# Patient Record
Sex: MALE | Race: WHITE | HISPANIC OR LATINO
[De-identification: names, ages, dates, MRNs, and addresses within clinical notes are randomized per-mention and may not be internally consistent; named-entity substitution may affect disease eponyms.]

---

## 2018-08-09 PROBLEM — Z00.00 ENCOUNTER FOR PREVENTIVE HEALTH EXAMINATION: Status: ACTIVE | Noted: 2018-08-09

## 2018-08-13 ENCOUNTER — APPOINTMENT (OUTPATIENT)
Dept: ORTHOPEDIC SURGERY | Facility: CLINIC | Age: 48
End: 2018-08-13
Payer: OTHER MISCELLANEOUS

## 2018-08-13 VITALS
HEART RATE: 78 BPM | SYSTOLIC BLOOD PRESSURE: 166 MMHG | HEIGHT: 65 IN | BODY MASS INDEX: 24.66 KG/M2 | DIASTOLIC BLOOD PRESSURE: 111 MMHG | WEIGHT: 148 LBS

## 2018-08-13 PROCEDURE — 99203 OFFICE O/P NEW LOW 30 MIN: CPT

## 2018-08-13 PROCEDURE — 73565 X-RAY EXAM OF KNEES: CPT

## 2018-09-04 ENCOUNTER — APPOINTMENT (OUTPATIENT)
Dept: ORTHOPEDIC SURGERY | Facility: CLINIC | Age: 48
End: 2018-09-04
Payer: OTHER MISCELLANEOUS

## 2018-09-04 DIAGNOSIS — Z78.9 OTHER SPECIFIED HEALTH STATUS: ICD-10-CM

## 2018-09-04 DIAGNOSIS — S80.01XA CONTUSION OF RIGHT KNEE, INITIAL ENCOUNTER: ICD-10-CM

## 2018-09-04 DIAGNOSIS — M25.561 PAIN IN RIGHT KNEE: ICD-10-CM

## 2018-09-04 PROCEDURE — 73562 X-RAY EXAM OF KNEE 3: CPT | Mod: RT

## 2018-09-04 PROCEDURE — 99213 OFFICE O/P EST LOW 20 MIN: CPT

## 2018-09-04 RX ORDER — IBUPROFEN 800 MG
TABLET ORAL
Refills: 0 | Status: ACTIVE | COMMUNITY

## 2020-07-25 ENCOUNTER — APPOINTMENT (OUTPATIENT)
Dept: MRI IMAGING | Facility: CLINIC | Age: 50
End: 2020-07-25

## 2020-08-01 ENCOUNTER — OUTPATIENT (OUTPATIENT)
Dept: OUTPATIENT SERVICES | Facility: HOSPITAL | Age: 50
LOS: 1 days | End: 2020-08-01
Payer: COMMERCIAL

## 2020-08-01 ENCOUNTER — APPOINTMENT (OUTPATIENT)
Dept: ULTRASOUND IMAGING | Facility: CLINIC | Age: 50
End: 2020-08-01
Payer: MEDICAID

## 2020-08-01 DIAGNOSIS — Z00.8 ENCOUNTER FOR OTHER GENERAL EXAMINATION: ICD-10-CM

## 2020-08-01 PROCEDURE — 76857 US EXAM PELVIC LIMITED: CPT

## 2020-08-01 PROCEDURE — 76857 US EXAM PELVIC LIMITED: CPT | Mod: 26

## 2023-02-25 ENCOUNTER — APPOINTMENT (OUTPATIENT)
Dept: ULTRASOUND IMAGING | Facility: CLINIC | Age: 53
End: 2023-02-25
Payer: COMMERCIAL

## 2023-02-25 PROCEDURE — 76700 US EXAM ABDOM COMPLETE: CPT

## 2023-03-13 ENCOUNTER — APPOINTMENT (OUTPATIENT)
Dept: GASTROENTEROLOGY | Facility: CLINIC | Age: 53
End: 2023-03-13
Payer: COMMERCIAL

## 2023-03-13 VITALS
WEIGHT: 153 LBS | HEART RATE: 72 BPM | BODY MASS INDEX: 25.49 KG/M2 | DIASTOLIC BLOOD PRESSURE: 90 MMHG | TEMPERATURE: 97.1 F | HEIGHT: 65 IN | SYSTOLIC BLOOD PRESSURE: 122 MMHG | OXYGEN SATURATION: 99 %

## 2023-03-13 DIAGNOSIS — K21.9 GASTRO-ESOPHAGEAL REFLUX DISEASE W/OUT ESOPHAGITIS: ICD-10-CM

## 2023-03-13 DIAGNOSIS — Z12.12 ENCOUNTER FOR SCREENING FOR MALIGNANT NEOPLASM OF COLON: ICD-10-CM

## 2023-03-13 DIAGNOSIS — R10.9 UNSPECIFIED ABDOMINAL PAIN: ICD-10-CM

## 2023-03-13 DIAGNOSIS — Z12.11 ENCOUNTER FOR SCREENING FOR MALIGNANT NEOPLASM OF COLON: ICD-10-CM

## 2023-03-13 PROCEDURE — 99204 OFFICE O/P NEW MOD 45 MIN: CPT

## 2023-03-13 NOTE — CONSULT LETTER
[Dear  ___] : Dear ~MINDY, [Consult Letter:] : I had the pleasure of evaluating your patient, [unfilled]. [Please see my note below.] : Please see my note below. [Consult Closing:] : Thank you very much for allowing me to participate in the care of this patient.  If you have any questions, please do not hesitate to contact me. [Sincerely,] : Sincerely, [FreeTextEntry2] : Leticia Herbert MD [FreeTextEntry3] : Esteban Carvalho MD\par

## 2023-03-13 NOTE — PHYSICAL EXAM
[Oropharynx] : the oropharynx was normal [Normal] : prostate: normal [Alert] : alert [Normal Voice/Communication] : normal voice/communication [Healthy Appearing] : healthy appearing [No Acute Distress] : no acute distress [Sclera] : the sclera and conjunctiva were normal [Hearing Threshold Finger Rub Not [place]] : hearing was normal [Normal Appearance] : the appearance of the neck was normal [No Respiratory Distress] : no respiratory distress [No Acc Muscle Use] : no accessory muscle use [Respiration, Rhythm And Depth] : normal respiratory rhythm and effort [Heart Rate And Rhythm] : heart rate was normal and rhythm regular [None] : no edema [Bowel Sounds] : normal bowel sounds [Abdomen Tenderness] : non-tender [No Masses] : no abdominal mass palpated [Abdomen Soft] : soft [] : no hepatosplenomegaly [Cervical Lymph Nodes Enlarged Posterior Bilaterally] : no posterior cervical lymphadenopathy [No CVA Tenderness] : no CVA  tenderness [No Spinal Tenderness] : no spinal tenderness [Abnormal Walk] : normal gait [No Focal Deficits] : no focal deficits [Oriented To Time, Place, And Person] : oriented to person, place, and time

## 2023-03-13 NOTE — PHYSICAL EXAM
[Oropharynx] : the oropharynx was normal [Normal] : prostate: normal [Alert] : alert [Normal Voice/Communication] : normal voice/communication [Healthy Appearing] : healthy appearing [No Acute Distress] : no acute distress [Sclera] : the sclera and conjunctiva were normal [Hearing Threshold Finger Rub Not [place]] : hearing was normal [Normal Appearance] : the appearance of the neck was normal [No Respiratory Distress] : no respiratory distress [No Acc Muscle Use] : no accessory muscle use [Respiration, Rhythm And Depth] : normal respiratory rhythm and effort [Heart Rate And Rhythm] : heart rate was normal and rhythm regular [None] : no edema [Bowel Sounds] : normal bowel sounds [Abdomen Tenderness] : non-tender [No Masses] : no abdominal mass palpated [Abdomen Soft] : soft [] : no hepatosplenomegaly [Cervical Lymph Nodes Enlarged Posterior Bilaterally] : no posterior cervical lymphadenopathy [No CVA Tenderness] : no CVA  tenderness [No Spinal Tenderness] : no spinal tenderness [Abnormal Walk] : normal gait [No Focal Deficits] : no focal deficits [Oriented To Time, Place, And Person] : oriented to person, place, and time

## 2023-03-15 NOTE — HISTORY OF PRESENT ILLNESS
[FreeTextEntry1] : Mar 13, 2023 \par \par DANIEL DALE MD\par \par [place] citizen speaking , phone translation used \par \par Mr. ANGELIKA GODOY 52 year is referred for colon cancer screening.  The patient denies any change in bowel movements, blood per rectum, abdominal, pelvic or rectal discomfort.   \par \par There is no family history of colon cancer or other gastrointestinal cancers.\par \par The patient denies any unexplained weight loss, fever chills or night sweats.\par \par This is the first screening colonoscopy for the patient.\par \par There is no significant cardiac or pulmonary history.\par \par No complaints of chest pain, shortness of breath, palpitations, cough.\par \par The patient is feeling quite well.\par \par The patient is on no significant anticoagulant therapy or anti platelet therapy. \par \par No adverse reaction to anesthesia in the past.\par \par

## 2023-03-15 NOTE — HISTORY OF PRESENT ILLNESS
[FreeTextEntry1] : Mar 13, 2023 \par \par DANIEL DALE MD\par \par [place] speaking , phone translation used \par \par Mr. ANGELIKA GODOY 52 year is referred for colon cancer screening.  The patient denies any change in bowel movements, blood per rectum, abdominal, pelvic or rectal discomfort.   \par \par There is no family history of colon cancer or other gastrointestinal cancers.\par \par The patient denies any unexplained weight loss, fever chills or night sweats.\par \par This is the first screening colonoscopy for the patient.\par \par There is no significant cardiac or pulmonary history.\par \par No complaints of chest pain, shortness of breath, palpitations, cough.\par \par The patient is feeling quite well.\par \par The patient is on no significant anticoagulant therapy or anti platelet therapy. \par \par No adverse reaction to anesthesia in the past.\par \par

## 2023-06-26 ENCOUNTER — APPOINTMENT (OUTPATIENT)
Dept: GASTROENTEROLOGY | Facility: AMBULATORY MEDICAL SERVICES | Age: 53
End: 2023-06-26
Payer: COMMERCIAL

## 2023-06-26 DIAGNOSIS — A04.8 OTHER SPECIFIED BACTERIAL INTESTINAL INFECTIONS: ICD-10-CM

## 2023-06-26 PROCEDURE — 43239 EGD BIOPSY SINGLE/MULTIPLE: CPT

## 2023-06-30 ENCOUNTER — NON-APPOINTMENT (OUTPATIENT)
Age: 53
End: 2023-06-30

## 2023-06-30 PROBLEM — A04.8 H. PYLORI INFECTION: Status: ACTIVE | Noted: 2023-06-30

## 2023-07-05 ENCOUNTER — NON-APPOINTMENT (OUTPATIENT)
Age: 53
End: 2023-07-05

## 2023-07-07 RX ORDER — BISMUTH SUBSALICYLATE 262 MG
262 TABLET,CHEWABLE ORAL
Qty: 84 | Refills: 0 | Status: ACTIVE | COMMUNITY
Start: 2023-06-30

## 2023-07-17 RX ORDER — POLYETHYLENE GLYCOL 3350, SODIUM CHLORIDE, SODIUM BICARBONATE AND POTASSIUM CHLORIDE WITH LEMON FLAVOR 420; 11.2; 5.72; 1.48 G/4L; G/4L; G/4L; G/4L
420 POWDER, FOR SOLUTION ORAL
Qty: 1 | Refills: 0 | Status: ACTIVE | COMMUNITY
Start: 2023-03-13 | End: 1900-01-01

## 2023-07-17 RX ORDER — FAMOTIDINE 40 MG/1
40 TABLET, FILM COATED ORAL DAILY
Qty: 30 | Refills: 2 | Status: ACTIVE | COMMUNITY
Start: 2023-03-13 | End: 1900-01-01

## 2023-07-25 RX ORDER — PANTOPRAZOLE 40 MG/1
40 TABLET, DELAYED RELEASE ORAL
Qty: 28 | Refills: 0 | Status: ACTIVE | COMMUNITY
Start: 2023-06-30 | End: 1900-01-01

## 2023-07-25 RX ORDER — AMOXICILLIN 500 MG/1
500 TABLET, FILM COATED ORAL
Qty: 56 | Refills: 0 | Status: ACTIVE | COMMUNITY
Start: 2023-06-30 | End: 1900-01-01

## 2023-08-01 ENCOUNTER — APPOINTMENT (OUTPATIENT)
Dept: GASTROENTEROLOGY | Facility: AMBULATORY MEDICAL SERVICES | Age: 53
End: 2023-08-01
Payer: COMMERCIAL

## 2023-08-01 PROCEDURE — 45378 DIAGNOSTIC COLONOSCOPY: CPT | Mod: 33

## 2023-08-07 RX ORDER — CLARITHROMYCIN 500 MG/1
500 TABLET, FILM COATED ORAL
Qty: 28 | Refills: 0 | Status: ACTIVE | COMMUNITY
Start: 2023-08-07 | End: 1900-01-01

## 2023-08-07 RX ORDER — CLARITHROMYCIN 500 MG/1
500 TABLET, FILM COATED, EXTENDED RELEASE ORAL
Qty: 28 | Refills: 0 | Status: DISCONTINUED | COMMUNITY
Start: 2023-06-30 | End: 2023-08-07

## 2023-08-15 ENCOUNTER — NON-APPOINTMENT (OUTPATIENT)
Age: 53
End: 2023-08-15